# Patient Record
Sex: MALE | Race: ASIAN | NOT HISPANIC OR LATINO | URBAN - METROPOLITAN AREA
[De-identification: names, ages, dates, MRNs, and addresses within clinical notes are randomized per-mention and may not be internally consistent; named-entity substitution may affect disease eponyms.]

---
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
## 2021-05-31 VITALS — BODY MASS INDEX: 26.9 KG/M2 | WEIGHT: 160.4 LBS

## 2021-06-11 NOTE — PROGRESS NOTES
Subjective:      Bobby Mann is a 17 y.o. male here for evaluation of sore throat x 2 days. Pain with swallowing, no significant changes in appetite or fluid intake, no N/V/D, no stomach ache. Has tried some Nyquil, not much relief.  Little bit of nasal congestion, denies runny nose or cough. No fevers, afebrile in clinic. Girlfriend hasn't been feeling well either.      Objective:     Vitals:    06/09/17 1455   BP: 112/68   Pulse: 78   Resp: 15   Temp: 98.2  F (36.8  C)   SpO2: 98%       General: Alert,  NAD, cooperative on exam  Eyes: PERRLA, EOMI, conjunctivae clear.   Ears: Right TM; pink and translucent. Left TM; pink and translucent   Nose:  Nasal mucosa pink and moist.  Mouth/Throat:  Tonsillar hypertrophy, 2+, erythematous, no exudate. Uvula midline. Posterior pharynx erythematous.  Mucus membranes pink and moist, free of lesions.  Neck: Supple, symmetrical, trachea midline, no adenopathy   Lungs: CTA bilaterally, good air movement throughout. No rales, rhonchi or wheezing  Heart:: Regular rate and rhythm, S1, S2 normal, no murmur, click, rub or gallop      Results for orders placed or performed in visit on 06/09/17   Rapid Strep A Screen-Throat   Result Value Ref Range    Rapid Strep A Antigen No Group A Strep detected, presumptive negative No Group A Strep detected, presumptive negative            Assessment/Plan:      1. Acute pharyngitis    2. Throat pain  - Rapid Strep A Screen-Throat  - Group A Strep, RNA Direct Detection, Throat     I reviewed exam and lab findings with patient. Will contact patient in next 48 hours only if strep confirmatory test positive, would prescribe appropriate antibiotics at that time. Contagious precautions reviewed just in case. If step is negative, likely viral illness that will resolve spontaneously. Recommended warm salt water gargles. May use tylenol or ibuprofen for discomfort.  Suck on hard candies/throat lozenges, humidified air.  Push fluids and get lots of rest.  F/u with PCP if symptoms persist or worsen in any way. Patient verbalized understanding and agrees with plan of care.    -Patient instructions given.